# Patient Record
Sex: FEMALE | Race: WHITE | NOT HISPANIC OR LATINO | URBAN - METROPOLITAN AREA
[De-identification: names, ages, dates, MRNs, and addresses within clinical notes are randomized per-mention and may not be internally consistent; named-entity substitution may affect disease eponyms.]

---

## 2024-06-25 ENCOUNTER — APPOINTMENT (OUTPATIENT)
Dept: PRIMARY CARE | Facility: CLINIC | Age: 44
End: 2024-06-25
Payer: COMMERCIAL

## 2024-07-18 ENCOUNTER — LAB (OUTPATIENT)
Dept: LAB | Facility: LAB | Age: 44
End: 2024-07-18
Payer: COMMERCIAL

## 2024-07-18 ENCOUNTER — APPOINTMENT (OUTPATIENT)
Dept: PRIMARY CARE | Facility: CLINIC | Age: 44
End: 2024-07-18
Payer: COMMERCIAL

## 2024-07-18 VITALS
HEART RATE: 54 BPM | SYSTOLIC BLOOD PRESSURE: 107 MMHG | TEMPERATURE: 96.8 F | BODY MASS INDEX: 24.31 KG/M2 | WEIGHT: 132.1 LBS | DIASTOLIC BLOOD PRESSURE: 70 MMHG | HEIGHT: 62 IN

## 2024-07-18 DIAGNOSIS — N92.6 MENSTRUAL CHANGES: ICD-10-CM

## 2024-07-18 DIAGNOSIS — Z00.00 HEALTHCARE MAINTENANCE: ICD-10-CM

## 2024-07-18 DIAGNOSIS — S16.1XXD STRAIN OF NECK MUSCLE, SUBSEQUENT ENCOUNTER: ICD-10-CM

## 2024-07-18 DIAGNOSIS — Z00.00 HEALTHCARE MAINTENANCE: Primary | ICD-10-CM

## 2024-07-18 DIAGNOSIS — S39.012D STRAIN OF LUMBAR REGION, SUBSEQUENT ENCOUNTER: ICD-10-CM

## 2024-07-18 DIAGNOSIS — R53.83 FATIGUE, UNSPECIFIED TYPE: ICD-10-CM

## 2024-07-18 DIAGNOSIS — Z12.31 VISIT FOR SCREENING MAMMOGRAM: ICD-10-CM

## 2024-07-18 PROBLEM — S16.1XXA STRAIN OF NECK: Status: ACTIVE | Noted: 2024-07-18

## 2024-07-18 PROBLEM — R05.9 COUGH: Status: RESOLVED | Noted: 2024-07-18 | Resolved: 2024-07-18

## 2024-07-18 PROBLEM — J20.9 ACUTE BRONCHITIS: Status: RESOLVED | Noted: 2024-07-18 | Resolved: 2024-07-18

## 2024-07-18 PROBLEM — R42 DIZZINESS: Status: RESOLVED | Noted: 2024-07-18 | Resolved: 2024-07-18

## 2024-07-18 PROBLEM — S39.012A LOW BACK STRAIN: Status: ACTIVE | Noted: 2024-07-18

## 2024-07-18 LAB
ALBUMIN SERPL BCP-MCNC: 4.7 G/DL (ref 3.4–5)
ALP SERPL-CCNC: 49 U/L (ref 33–110)
ALT SERPL W P-5'-P-CCNC: 9 U/L (ref 7–45)
ANION GAP SERPL CALC-SCNC: 12 MMOL/L (ref 10–20)
AST SERPL W P-5'-P-CCNC: 11 U/L (ref 9–39)
BASOPHILS # BLD AUTO: 0.05 X10*3/UL (ref 0–0.1)
BASOPHILS NFR BLD AUTO: 0.8 %
BILIRUB SERPL-MCNC: 0.6 MG/DL (ref 0–1.2)
BUN SERPL-MCNC: 15 MG/DL (ref 6–23)
CALCIUM SERPL-MCNC: 9.7 MG/DL (ref 8.6–10.6)
CHLORIDE SERPL-SCNC: 105 MMOL/L (ref 98–107)
CHOLEST SERPL-MCNC: 225 MG/DL (ref 0–199)
CHOLESTEROL/HDL RATIO: 3.6
CO2 SERPL-SCNC: 26 MMOL/L (ref 21–32)
CREAT SERPL-MCNC: 0.7 MG/DL (ref 0.5–1.05)
EGFRCR SERPLBLD CKD-EPI 2021: >90 ML/MIN/1.73M*2
EOSINOPHIL # BLD AUTO: 0.03 X10*3/UL (ref 0–0.7)
EOSINOPHIL NFR BLD AUTO: 0.5 %
ERYTHROCYTE [DISTWIDTH] IN BLOOD BY AUTOMATED COUNT: 12.9 % (ref 11.5–14.5)
FSH SERPL-ACNC: 4.6 IU/L
GLUCOSE SERPL-MCNC: 83 MG/DL (ref 74–99)
HCT VFR BLD AUTO: 41.4 % (ref 36–46)
HDLC SERPL-MCNC: 62.8 MG/DL
HGB BLD-MCNC: 13.6 G/DL (ref 12–16)
IMM GRANULOCYTES # BLD AUTO: 0.01 X10*3/UL (ref 0–0.7)
IMM GRANULOCYTES NFR BLD AUTO: 0.2 % (ref 0–0.9)
LDLC SERPL CALC-MCNC: 146 MG/DL
LH SERPL-ACNC: 3 IU/L
LYMPHOCYTES # BLD AUTO: 2.13 X10*3/UL (ref 1.2–4.8)
LYMPHOCYTES NFR BLD AUTO: 33.8 %
MCH RBC QN AUTO: 29 PG (ref 26–34)
MCHC RBC AUTO-ENTMCNC: 32.9 G/DL (ref 32–36)
MCV RBC AUTO: 88 FL (ref 80–100)
MONOCYTES # BLD AUTO: 0.49 X10*3/UL (ref 0.1–1)
MONOCYTES NFR BLD AUTO: 7.8 %
NEUTROPHILS # BLD AUTO: 3.59 X10*3/UL (ref 1.2–7.7)
NEUTROPHILS NFR BLD AUTO: 56.9 %
NON HDL CHOLESTEROL: 162 MG/DL (ref 0–149)
NRBC BLD-RTO: 0 /100 WBCS (ref 0–0)
PLATELET # BLD AUTO: 340 X10*3/UL (ref 150–450)
POTASSIUM SERPL-SCNC: 4.4 MMOL/L (ref 3.5–5.3)
PROT SERPL-MCNC: 7.3 G/DL (ref 6.4–8.2)
RBC # BLD AUTO: 4.69 X10*6/UL (ref 4–5.2)
SODIUM SERPL-SCNC: 139 MMOL/L (ref 136–145)
TRIGL SERPL-MCNC: 79 MG/DL (ref 0–149)
TSH SERPL-ACNC: 1.28 MIU/L (ref 0.44–3.98)
VLDL: 16 MG/DL (ref 0–40)
WBC # BLD AUTO: 6.3 X10*3/UL (ref 4.4–11.3)

## 2024-07-18 PROCEDURE — 80061 LIPID PANEL: CPT

## 2024-07-18 PROCEDURE — 83002 ASSAY OF GONADOTROPIN (LH): CPT

## 2024-07-18 PROCEDURE — 3008F BODY MASS INDEX DOCD: CPT | Performed by: INTERNAL MEDICINE

## 2024-07-18 PROCEDURE — 36415 COLL VENOUS BLD VENIPUNCTURE: CPT

## 2024-07-18 PROCEDURE — 99396 PREV VISIT EST AGE 40-64: CPT | Performed by: INTERNAL MEDICINE

## 2024-07-18 PROCEDURE — 83001 ASSAY OF GONADOTROPIN (FSH): CPT

## 2024-07-18 PROCEDURE — 1036F TOBACCO NON-USER: CPT | Performed by: INTERNAL MEDICINE

## 2024-07-18 PROCEDURE — 85025 COMPLETE CBC W/AUTO DIFF WBC: CPT

## 2024-07-18 PROCEDURE — 84443 ASSAY THYROID STIM HORMONE: CPT

## 2024-07-18 PROCEDURE — 80053 COMPREHEN METABOLIC PANEL: CPT

## 2024-07-18 RX ORDER — ZINC GLUCONATE 50 MG
1 TABLET ORAL DAILY
COMMUNITY
Start: 2021-09-02

## 2024-07-18 RX ORDER — MULTIVITAMIN
TABLET ORAL
COMMUNITY

## 2024-07-18 ASSESSMENT — ENCOUNTER SYMPTOMS
DYSPHORIC MOOD: 0
FEVER: 0
CONFUSION: 0
MYALGIAS: 1
DIARRHEA: 0
NAUSEA: 0
HEMATURIA: 0
LIGHT-HEADEDNESS: 0
HEADACHES: 0
PALPITATIONS: 0
CHILLS: 0
ABDOMINAL PAIN: 0
DIZZINESS: 0
SHORTNESS OF BREATH: 0
VOMITING: 0
COUGH: 0
ARTHRALGIAS: 1
FATIGUE: 1
DYSURIA: 0
SORE THROAT: 0
CONSTIPATION: 0

## 2024-07-18 ASSESSMENT — PATIENT HEALTH QUESTIONNAIRE - PHQ9
1. LITTLE INTEREST OR PLEASURE IN DOING THINGS: NOT AT ALL
SUM OF ALL RESPONSES TO PHQ9 QUESTIONS 1 AND 2: 0
2. FEELING DOWN, DEPRESSED OR HOPELESS: NOT AT ALL

## 2024-07-18 NOTE — PROGRESS NOTES
CHIEF COMPLAINT  Annual Exam (Attacked by dog and has spinal injury from dog attack )    HISTORY OF PRESENT ILLNESS  Willow Suazo is a 43 y.o. female presents today for follow up of Annual Exam (Attacked by dog and has spinal injury from dog attack )    HPI    Last visit with me Sept 2021.  History reviewed and updated.    Periods are heavier and closer together.  More fatigue.  Eli.  Having some change in body composition, more adipose around abdominal area.     Last week Wednesday, she was out walking and a lady came by with a big dog.  The dog charged and knocked her over.  She landed on her low back and upper back / shoulders.  She went to ER and had CT scans.  No fractures / dislocation.    Sore all over, hard time bending down.  Neck strain.   ER prescribed her steroid taper and muscle relaxer, which is helping.     REVIEW OF SYSTEMS  Review of Systems   Constitutional:  Positive for fatigue. Negative for chills and fever.   HENT:  Negative for sore throat.    Respiratory:  Negative for cough and shortness of breath.    Cardiovascular:  Negative for chest pain, palpitations and leg swelling.   Gastrointestinal:  Negative for abdominal pain, constipation, diarrhea, nausea and vomiting.        Occasional fecal urgency, however no diarrhea   Genitourinary:  Negative for dysuria and hematuria.   Musculoskeletal:  Positive for arthralgias and myalgias.   Skin:  Negative for rash.   Neurological:  Negative for dizziness, light-headedness and headaches.   Psychiatric/Behavioral:  Negative for confusion and dysphoric mood.        ALLERGIES  Amoxicillin    MEDICATIONS  Current Outpatient Medications   Medication Instructions    multivitamin tablet oral    zinc gluconate 50 mg tablet 1 tablet, oral, Daily       TOBACCO USE  Social History     Tobacco Use   Smoking Status Former    Current packs/day: 0.00    Average packs/day: 1 pack/day for 13.0 years (13.0 ttl pk-yrs)    Types: Cigarettes    Start date: 2001    Quit  "date:     Years since quitting: 10.5   Smokeless Tobacco Never       DEPRESSION SCREEN  Over the past 2 weeks, how often have you been bothered by any of the following problems?  Little interest or pleasure in doing things: Not at all  Feeling down, depressed, or hopeless: Not at all    SURGICAL HISTORY  Past Surgical History:  2015:  SECTION, CLASSIC      Comment:   Section  10/15/2020: OTHER SURGICAL HISTORY      Comment:  Stomach surgery  10/15/2020: OTHER SURGICAL HISTORY      Comment:  Hernia repair  10/15/2020: OTHER SURGICAL HISTORY      Comment:  [place] tooth extraction       OBJECTIVE    /70   Pulse 54   Temp 36 °C (96.8 °F)   Ht 1.575 m (5' 2\")   Wt 59.9 kg (132 lb 1.6 oz)   BMI 24.16 kg/m²    BMI: Estimated body mass index is 24.16 kg/m² as calculated from the following:    Height as of this encounter: 1.575 m (5' 2\").    Weight as of this encounter: 59.9 kg (132 lb 1.6 oz).    BP Readings from Last 3 Encounters:   24 107/70   22 110/70   21 119/64      Wt Readings from Last 3 Encounters:   24 59.9 kg (132 lb 1.6 oz)   22 60.3 kg (133 lb)   21 58.6 kg (129 lb 3 oz)        PHYSICAL EXAM  Physical Exam  Constitutional:       Appearance: Normal appearance.   HENT:      Head: Normocephalic and atraumatic.      Right Ear: Tympanic membrane and ear canal normal.      Left Ear: Tympanic membrane and ear canal normal.   Cardiovascular:      Rate and Rhythm: Normal rate and regular rhythm.      Pulses: Normal pulses.      Heart sounds: No murmur heard.  Pulmonary:      Effort: Pulmonary effort is normal. No respiratory distress.      Breath sounds: Normal breath sounds. No wheezing.   Abdominal:      General: There is no distension.      Palpations: Abdomen is soft.      Tenderness: There is no abdominal tenderness.   Musculoskeletal:      Right lower leg: No edema.      Left lower leg: No edema.   Skin:     Findings: No rash.   Neurological: "      General: No focal deficit present.      Mental Status: She is alert and oriented to person, place, and time. Mental status is at baseline.      Gait: Gait normal.   Psychiatric:         Mood and Affect: Mood normal.         Behavior: Behavior normal.         Thought Content: Thought content normal.         Judgment: Judgment normal.          ASSESSMENT AND PLAN  Assessment/Plan   Problem List Items Addressed This Visit       Strain of neck    Relevant Orders    Referral to Physical Therapy    Low back strain    Relevant Orders    Referral to Physical Therapy    Fatigue    Menstrual changes     Other Visit Diagnoses       Healthcare maintenance    -  Primary    Relevant Orders    Lipid Panel    Comprehensive Metabolic Panel    CBC and Auto Differential    TSH with reflex to Free T4 if abnormal    FSH    Luteinizing hormone    Visit for screening mammogram        Relevant Orders    BI mammo bilateral screening tomosynthesis          Well Visit    BP is normal.  Continue with heart healthy habits.    Fasting labs - CBC, CMP, FLP.    Fatigue and menstrual changes - check additional albs including TSH with reflex T4, FSH, LH.    Mammogram ordered.    Pap plus HPV 7/6/22 - normal / negative.     Colon cancer screening starting age 45.    Reviewed signs of skin cancer and importance of sun protection.    Flu shot recommended annually in the fall.    Neck and low back strain after fall (knocked down by a large dog) - continue with medications prescribed in ER.  Can also use ice or heat, muscle rubs.  Referral to PT.    Follow-up annually and PRN.

## 2024-07-26 ENCOUNTER — TELEPHONE (OUTPATIENT)
Dept: PRIMARY CARE | Facility: CLINIC | Age: 44
End: 2024-07-26
Payer: COMMERCIAL

## 2024-08-05 ENCOUNTER — HOSPITAL ENCOUNTER (OUTPATIENT)
Dept: RADIOLOGY | Facility: CLINIC | Age: 44
Discharge: HOME | End: 2024-08-05
Payer: COMMERCIAL

## 2024-08-05 VITALS — BODY MASS INDEX: 24.84 KG/M2 | HEIGHT: 62 IN | WEIGHT: 135 LBS

## 2024-08-05 DIAGNOSIS — Z12.31 VISIT FOR SCREENING MAMMOGRAM: ICD-10-CM

## 2024-08-05 PROCEDURE — 77067 SCR MAMMO BI INCL CAD: CPT | Performed by: STUDENT IN AN ORGANIZED HEALTH CARE EDUCATION/TRAINING PROGRAM

## 2024-08-05 PROCEDURE — 77067 SCR MAMMO BI INCL CAD: CPT

## 2024-08-05 PROCEDURE — 77063 BREAST TOMOSYNTHESIS BI: CPT | Performed by: STUDENT IN AN ORGANIZED HEALTH CARE EDUCATION/TRAINING PROGRAM

## 2025-06-20 ENCOUNTER — APPOINTMENT (OUTPATIENT)
Facility: CLINIC | Age: 45
End: 2025-06-20
Payer: COMMERCIAL

## 2025-07-02 ENCOUNTER — TELEPHONE (OUTPATIENT)
Facility: CLINIC | Age: 45
End: 2025-07-02
Payer: COMMERCIAL

## 2025-07-02 NOTE — TELEPHONE ENCOUNTER
Called patient LVM to reschedule appointment. Patient has not been seen since 7/2022 and will now be considered a new patient. Appointment needs to be changed to a new patient appointment for 30 minutes.    English Robledo

## 2025-07-21 ENCOUNTER — APPOINTMENT (OUTPATIENT)
Dept: PRIMARY CARE | Facility: CLINIC | Age: 45
End: 2025-07-21
Payer: COMMERCIAL

## 2025-07-21 VITALS
HEIGHT: 62 IN | OXYGEN SATURATION: 99 % | BODY MASS INDEX: 23.96 KG/M2 | HEART RATE: 56 BPM | DIASTOLIC BLOOD PRESSURE: 52 MMHG | SYSTOLIC BLOOD PRESSURE: 102 MMHG | WEIGHT: 130.2 LBS | TEMPERATURE: 97.1 F

## 2025-07-21 DIAGNOSIS — Z00.00 HEALTHCARE MAINTENANCE: Primary | ICD-10-CM

## 2025-07-21 DIAGNOSIS — Z12.31 VISIT FOR SCREENING MAMMOGRAM: ICD-10-CM

## 2025-07-21 PROCEDURE — 99396 PREV VISIT EST AGE 40-64: CPT | Performed by: INTERNAL MEDICINE

## 2025-07-21 PROCEDURE — 3008F BODY MASS INDEX DOCD: CPT | Performed by: INTERNAL MEDICINE

## 2025-07-21 ASSESSMENT — ENCOUNTER SYMPTOMS
LIGHT-HEADEDNESS: 0
BACK PAIN: 1
ABDOMINAL PAIN: 0
HEMATURIA: 0
COUGH: 0
FATIGUE: 0
VOMITING: 0
PALPITATIONS: 0
ARTHRALGIAS: 1
SHORTNESS OF BREATH: 0
NAUSEA: 0
SORE THROAT: 0
CHILLS: 0
HEADACHES: 0
FEVER: 0
CONSTIPATION: 0
DIARRHEA: 0
CONFUSION: 0
DIZZINESS: 0
DYSURIA: 0

## 2025-07-21 NOTE — PROGRESS NOTES
"CHIEF COMPLAINT  Annual Exam    HISTORY OF PRESENT ILLNESS  Willow Suazo is a 44 y.o. female presents today for follow up of Annual Exam    HPI    History reviewed and updated.  Follows with chiropractor for chronic back pain.  Exercises regularly - training for trail marathon taking place this coming weekend.  Periods are every 21 days or so, moderate flow, a little longer than usual.  Scheduled to follow-up with Dr. Cazares.     REVIEW OF SYSTEMS  Review of Systems   Constitutional:  Negative for chills, fatigue and fever.   HENT:  Negative for sore throat.    Respiratory:  Negative for cough and shortness of breath.    Cardiovascular:  Negative for chest pain, palpitations and leg swelling.   Gastrointestinal:  Negative for abdominal pain, constipation, diarrhea, nausea and vomiting.   Genitourinary:  Negative for dysuria and hematuria.   Musculoskeletal:  Positive for arthralgias and back pain.   Skin:  Negative for rash.   Neurological:  Negative for dizziness, light-headedness and headaches.   Psychiatric/Behavioral:  Negative for confusion.        ALLERGIES  Amoxicillin    MEDICATIONS  Current Outpatient Medications   Medication Instructions    multivitamin tablet oral    zinc gluconate 50 mg tablet 1 tablet, oral, Daily       TOBACCO USE  Tobacco Use History[1]      SURGICAL HISTORY  Past Surgical History:  2015:  SECTION, CLASSIC      Comment:   Section  10/15/2020: OTHER SURGICAL HISTORY      Comment:  Stomach surgery  10/15/2020: OTHER SURGICAL HISTORY      Comment:  Hernia repair  10/15/2020: OTHER SURGICAL HISTORY      Comment:  [place] tooth extraction       OBJECTIVE    /52   Pulse 56   Temp 36.2 °C (97.1 °F)   Ht 1.575 m (5' 2\")   Wt 59.1 kg (130 lb 3.2 oz)   SpO2 99%   BMI 23.81 kg/m²    BMI: Estimated body mass index is 23.81 kg/m² as calculated from the following:    Height as of this encounter: 1.575 m (5' 2\").    Weight as of this encounter: 59.1 kg (130 lb " 3.2 oz).    BP Readings from Last 3 Encounters:   07/21/25 102/52   07/18/24 107/70   07/06/22 110/70      Wt Readings from Last 3 Encounters:   07/21/25 59.1 kg (130 lb 3.2 oz)   08/05/24 61.2 kg (135 lb)   07/18/24 59.9 kg (132 lb 1.6 oz)        PHYSICAL EXAM  Physical Exam  Constitutional:       Appearance: Normal appearance.   HENT:      Head: Normocephalic and atraumatic.      Right Ear: Tympanic membrane and ear canal normal.      Left Ear: Tympanic membrane and ear canal normal.     Cardiovascular:      Rate and Rhythm: Normal rate and regular rhythm.      Pulses: Normal pulses.      Heart sounds: No murmur heard.  Pulmonary:      Effort: Pulmonary effort is normal. No respiratory distress.      Breath sounds: Normal breath sounds. No wheezing.   Abdominal:      General: There is no distension.      Palpations: Abdomen is soft.      Tenderness: There is no abdominal tenderness.     Musculoskeletal:      Right lower leg: No edema.      Left lower leg: No edema.     Skin:     Findings: No rash.     Neurological:      General: No focal deficit present.      Mental Status: She is alert and oriented to person, place, and time. Mental status is at baseline.      Gait: Gait normal.     Psychiatric:         Mood and Affect: Mood normal.         Behavior: Behavior normal.         Thought Content: Thought content normal.         Judgment: Judgment normal.          ASSESSMENT AND PLAN  Assessment/Plan   Problem List Items Addressed This Visit       Healthcare maintenance - Primary    Relevant Orders    Lipid Panel    Comprehensive Metabolic Panel    CBC and Auto Differential    Visit for screening mammogram    Relevant Orders    BI mammo bilateral screening tomosynthesis       Well Visit     BP is normal.  Continue with heart healthy habits.     Fasting labs - CBC, CMP, FLP.     Mammogram ordered.     Pap plus HPV 7/6/22 - normal / negative.  Has follow-up scheduled with GYN.      Colon cancer screening starting age  45.     Reviewed signs of skin cancer and importance of sun protection.     Flu shot recommended annually in the fall.    Follows with chiropractor for chronic back pain.      Follow-up annually.            [1]   Social History  Tobacco Use   Smoking Status Former    Current packs/day: 0.00    Average packs/day: 1 pack/day for 13.0 years (13.0 ttl pk-yrs)    Types: Cigarettes    Start date:     Quit date:     Years since quittin.5   Smokeless Tobacco Never

## 2025-07-22 LAB
ALBUMIN SERPL-MCNC: 4.4 G/DL (ref 3.6–5.1)
ALP SERPL-CCNC: 43 U/L (ref 31–125)
ALT SERPL-CCNC: 9 U/L (ref 6–29)
ANION GAP SERPL CALCULATED.4IONS-SCNC: 7 MMOL/L (CALC) (ref 7–17)
AST SERPL-CCNC: 13 U/L (ref 10–30)
BASOPHILS # BLD AUTO: 39 CELLS/UL (ref 0–200)
BASOPHILS NFR BLD AUTO: 1 %
BILIRUB SERPL-MCNC: 0.7 MG/DL (ref 0.2–1.2)
BUN SERPL-MCNC: 14 MG/DL (ref 7–25)
CALCIUM SERPL-MCNC: 9.2 MG/DL (ref 8.6–10.2)
CHLORIDE SERPL-SCNC: 105 MMOL/L (ref 98–110)
CHOLEST SERPL-MCNC: 192 MG/DL
CHOLEST/HDLC SERPL: 3.2 (CALC)
CO2 SERPL-SCNC: 27 MMOL/L (ref 20–32)
CREAT SERPL-MCNC: 0.81 MG/DL (ref 0.5–0.99)
EGFRCR SERPLBLD CKD-EPI 2021: 92 ML/MIN/1.73M2
EOSINOPHIL # BLD AUTO: 70 CELLS/UL (ref 15–500)
EOSINOPHIL NFR BLD AUTO: 1.8 %
ERYTHROCYTE [DISTWIDTH] IN BLOOD BY AUTOMATED COUNT: 13.2 % (ref 11–15)
GLUCOSE SERPL-MCNC: 85 MG/DL (ref 65–99)
HCT VFR BLD AUTO: 41.3 % (ref 35–45)
HDLC SERPL-MCNC: 60 MG/DL
HGB BLD-MCNC: 13.1 G/DL (ref 11.7–15.5)
LDLC SERPL CALC-MCNC: 112 MG/DL (CALC)
LYMPHOCYTES # BLD AUTO: 1677 CELLS/UL (ref 850–3900)
LYMPHOCYTES NFR BLD AUTO: 43 %
MCH RBC QN AUTO: 29.2 PG (ref 27–33)
MCHC RBC AUTO-ENTMCNC: 31.7 G/DL (ref 32–36)
MCV RBC AUTO: 92.2 FL (ref 80–100)
MONOCYTES # BLD AUTO: 300 CELLS/UL (ref 200–950)
MONOCYTES NFR BLD AUTO: 7.7 %
NEUTROPHILS # BLD AUTO: 1814 CELLS/UL (ref 1500–7800)
NEUTROPHILS NFR BLD AUTO: 46.5 %
NONHDLC SERPL-MCNC: 132 MG/DL (CALC)
PLATELET # BLD AUTO: 252 THOUSAND/UL (ref 140–400)
PMV BLD REES-ECKER: 10.1 FL (ref 7.5–12.5)
POTASSIUM SERPL-SCNC: 4.6 MMOL/L (ref 3.5–5.3)
PROT SERPL-MCNC: 6.8 G/DL (ref 6.1–8.1)
RBC # BLD AUTO: 4.48 MILLION/UL (ref 3.8–5.1)
SODIUM SERPL-SCNC: 139 MMOL/L (ref 135–146)
TRIGL SERPL-MCNC: 92 MG/DL
WBC # BLD AUTO: 3.9 THOUSAND/UL (ref 3.8–10.8)

## 2025-08-01 ENCOUNTER — APPOINTMENT (OUTPATIENT)
Facility: CLINIC | Age: 45
End: 2025-08-01
Payer: COMMERCIAL

## 2025-08-29 ENCOUNTER — TELEPHONE (OUTPATIENT)
Dept: PRIMARY CARE | Facility: CLINIC | Age: 45
End: 2025-08-29

## 2025-08-29 ENCOUNTER — APPOINTMENT (OUTPATIENT)
Facility: CLINIC | Age: 45
End: 2025-08-29
Payer: COMMERCIAL

## 2026-07-27 ENCOUNTER — APPOINTMENT (OUTPATIENT)
Dept: PRIMARY CARE | Facility: CLINIC | Age: 46
End: 2026-07-27
Payer: COMMERCIAL